# Patient Record
Sex: MALE | Race: WHITE | NOT HISPANIC OR LATINO
[De-identification: names, ages, dates, MRNs, and addresses within clinical notes are randomized per-mention and may not be internally consistent; named-entity substitution may affect disease eponyms.]

---
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
## 2021-05-13 ENCOUNTER — TRANSCRIPTION ENCOUNTER (OUTPATIENT)
Age: 21
End: 2021-05-13

## 2023-05-22 ENCOUNTER — APPOINTMENT (OUTPATIENT)
Dept: ORTHOPEDIC SURGERY | Facility: CLINIC | Age: 23
End: 2023-05-22
Payer: COMMERCIAL

## 2023-05-22 VITALS — BODY MASS INDEX: 22.46 KG/M2 | HEIGHT: 74 IN | WEIGHT: 175 LBS

## 2023-05-22 PROCEDURE — 99204 OFFICE O/P NEW MOD 45 MIN: CPT

## 2023-05-29 NOTE — IMAGING
[de-identified] : Left middle finger with pulp laceration. No erythema nor drainage. Able to flex/extend MCP, PIP and DIP. Sensation grossly intact. <2sec cap refill.

## 2023-05-29 NOTE — ASSESSMENT
[FreeTextEntry1] : Left middle finger laceration - reviewed pathoanatomy. Will complete course of abx. ROM permitted. Discussed risk of stiffness, pain, infection.\par \par F/u 1 week

## 2023-05-29 NOTE — HISTORY OF PRESENT ILLNESS
[de-identified] : 22M, RHD, No PMHX presents with left hand middle finger laceration from 5/12/23. Reprots weas moving a ramp in a backyard when a screw was out, his figner was caught and he obtained a laceration. He Reports going to [facility] and sutures placed. He admits to feeling numbness/tingling in the tip of the finger. Denies pain. presents splinted and wrapped.

## 2023-06-05 ENCOUNTER — APPOINTMENT (OUTPATIENT)
Dept: ORTHOPEDIC SURGERY | Facility: CLINIC | Age: 23
End: 2023-06-05
Payer: COMMERCIAL

## 2023-06-05 DIAGNOSIS — S69.90XA UNSPECIFIED INJURY OF UNSPECIFIED WRIST, HAND AND FINGER(S), INITIAL ENCOUNTER: ICD-10-CM

## 2023-06-05 PROCEDURE — 99213 OFFICE O/P EST LOW 20 MIN: CPT

## 2023-06-05 NOTE — IMAGING
[de-identified] : Left middle finger with pulp laceration - healed, callus flap debrided. No erythema nor drainage. Able to flex/extend MCP, PIP and DIP. Sensation grossly intact. <2sec cap refill.

## 2023-06-05 NOTE — ASSESSMENT
[FreeTextEntry1] : Left middle finger laceration - reviewed pathoanatomy.  ROM permitted. Discussed risk of stiffness, pain, infection. Flap of dead skin debrided.\par \par F/u 2.5 week; consider OT if tight

## 2023-06-05 NOTE — HISTORY OF PRESENT ILLNESS
[de-identified] : 22M, RHD, No PMHX presents with left hand middle finger laceration from 5/12/23. Reprots weas moving a ramp in a backyard when a screw was out, his figner was caught and he obtained a laceration. He Reports going to [facility] and sutures placed. He admits to feeling numbness/tingling in the tip of the finger. Denies pain. presents splinted and wrapped.\par \par 06/05/2023: f/u left middle finger. Reports he has more relief since last visit. Denies any pain medication. States he has numbness at the tip of his left middle finger and on the side.

## 2023-06-26 ENCOUNTER — APPOINTMENT (OUTPATIENT)
Dept: ORTHOPEDIC SURGERY | Facility: CLINIC | Age: 23
End: 2023-06-26

## 2025-03-14 ENCOUNTER — EMERGENCY (EMERGENCY)
Facility: HOSPITAL | Age: 25
LOS: 1 days | Discharge: HOME | End: 2025-03-14
Attending: EMERGENCY MEDICINE
Payer: COMMERCIAL

## 2025-03-14 VITALS
RESPIRATION RATE: 18 BRPM | TEMPERATURE: 98 F | SYSTOLIC BLOOD PRESSURE: 133 MMHG | HEIGHT: 65 IN | DIASTOLIC BLOOD PRESSURE: 67 MMHG | HEART RATE: 71 BPM | OXYGEN SATURATION: 100 % | WEIGHT: 182.32 LBS

## 2025-03-14 PROCEDURE — 99283 EMERGENCY DEPT VISIT LOW MDM: CPT

## 2025-03-14 PROCEDURE — 73562 X-RAY EXAM OF KNEE 3: CPT

## 2025-03-14 PROCEDURE — 73562 X-RAY EXAM OF KNEE 3: CPT | Mod: 26,LT

## 2025-03-14 RX ORDER — IBUPROFEN 200 MG
400 TABLET ORAL ONCE
Refills: 0 | Status: COMPLETED | OUTPATIENT
Start: 2025-03-14 | End: 2025-03-14

## 2025-03-14 RX ADMIN — Medication 400 MILLIGRAM(S): at 12:25

## 2025-03-14 NOTE — ED PROVIDER NOTE - PLAN OF CARE
23 y/o male with atraumatic left knee pain. Xrays reviewed, negative for fracture/dislocation. He was placed in a compressive ace wrap for support and advised that this could be switched to knee sleeve if needed. RICE therapy advised. Will follow up with outpatient ortho clinic.

## 2025-03-14 NOTE — ED PROVIDER NOTE - OBJECTIVE STATEMENT
23 y/o male with no PMHx presents to the ED for evaluation of left knee pain. Patient states he was playing soccer 4 days ago when he noticed lateral sided knee pain. Denies any fall or collision and was able to continue playing through pain. States he has had increased pain throughout the week with clicking but denies locking, buckling. Does describe pain at extremes of motion as well as some mild pain with ambulation. No allergies to medications.

## 2025-03-14 NOTE — ED PROVIDER NOTE - PHYSICAL EXAMINATION
GEN - NAD; well appearing; A&O x3   HEAD - NC/AT   EYES- PERRL, EOMI  ENT: Airway patent, mmm, Oral cavity and pharynx normal. No inflammation, swelling, exudate, or lesions.    BACK - Normal  spine   EXTREMITIES -  degrees L knee, medial joint line tenderness, (-) Carrol, (-) Steinmann, otherwise FROM, symmetric pulses, capillary refill < 2 seconds, no edema, 5/5 strength in b/l UE and LE  SKIN - no rash or bruising   NEUROLOGIC - alert, speech clear, no focal deficits, CN II-XII grossly intact, sensation grossly intact  PSYCH -nl mood/affect, nl insight.

## 2025-03-14 NOTE — ED PROVIDER NOTE - NSFOLLOWUPINSTRUCTIONS_ED_ALL_ED_FT
Continue ace wrap / knee sleeve for support.   Rest, Ice, and elevation encouraged  Ibuprofen as needed for pain  Ortho follow up: Dr. Steven Bates [phone]

## 2025-03-14 NOTE — ED PROVIDER NOTE - NS ED ROS FT
ROS   GENERAL: No fever, no chills, no malaise, no fatigue   ENT: No earache, no coryza, no sore throat   NECK: No stiffness, no swollen glands, no dysphagia   RESPIRATORY: No cough, no dyspnea, no pleuritic chest pain   HEART: no chest pain, no palpitations, no edema, no jvd   ABDOMEN: No abdominal pain, no nausea, no vomiting, no diarrhea   : No dysuria, no increase frequency, no urgency, No discharge   MUSCULAR-SKELETAL: No myalgia, + arthralgia   NEUROLOGY: No headache, no vertigo, no paresthesia, no focal deficits, no diplopia   SKIN: No rash, no evidence of trauma  All other ROS are negative

## 2025-03-14 NOTE — ED PROVIDER NOTE - ADDITIONAL NOTES AND INSTRUCTIONS:
Continue ace wrap / knee sleeve for support.   Rest, Ice, and elevation encouraged  Ibuprofen as needed for pain  Ortho follow up: Dr. Steven Bates [phone]

## 2025-03-14 NOTE — ED PROVIDER NOTE - CARE PLAN
Assessment and plan of treatment:	23 y/o male with atraumatic left knee pain. Xrays reviewed, negative for fracture/dislocation. He was placed in a compressive ace wrap for support and advised that this could be switched to knee sleeve if needed. RICE therapy advised. Will follow up with outpatient ortho clinic.

## 2025-03-14 NOTE — ED PROVIDER NOTE - CLINICAL SUMMARY MEDICAL DECISION MAKING FREE TEXT BOX
24 year old male with left knee pain after playing soccer. Symptoms consistent with meniscal pathology vs ligamentous injury. Will obtain xray of the L knee to rule out fracture or dislocation. If xray negative will recommend follow up with follow up with outpatient orthopedics. Motrin provided for pain control. RICE therapy also recommended for symptomatic treatment.

## 2025-03-14 NOTE — ED ADULT NURSE NOTE - OBJECTIVE STATEMENT
Pt A&OX4. Came in w/ c/o left knee pain after playing soccer on Monday. Pt felt and heard a "click" in his left knee. Denies numbness, weakness, tingling, +ROM, chest pain, palpitations. VS stable, RR even and unlabored, safety maintained.

## 2025-03-14 NOTE — ED PROVIDER NOTE - ATTENDING APP SHARED VISIT CONTRIBUTION OF CARE
23 y/o male with no PMHx presents to the ED for evaluation of left knee pain after playing soccer 4 days ago;  pe left knee from ; no laxity; 5/5 strength TTP over medially;   dx knee pain; xray ; ace and  referral to ortho

## 2025-03-19 ENCOUNTER — APPOINTMENT (OUTPATIENT)
Dept: ORTHOPEDIC SURGERY | Facility: CLINIC | Age: 25
End: 2025-03-19
Payer: COMMERCIAL

## 2025-03-19 VITALS
HEART RATE: 74 BPM | WEIGHT: 180 LBS | BODY MASS INDEX: 23.1 KG/M2 | DIASTOLIC BLOOD PRESSURE: 81 MMHG | HEIGHT: 74 IN | SYSTOLIC BLOOD PRESSURE: 129 MMHG

## 2025-03-19 DIAGNOSIS — M25.562 PAIN IN LEFT KNEE: ICD-10-CM

## 2025-03-19 PROCEDURE — 99203 OFFICE O/P NEW LOW 30 MIN: CPT

## 2025-03-20 ENCOUNTER — OUTPATIENT (OUTPATIENT)
Dept: OUTPATIENT SERVICES | Facility: HOSPITAL | Age: 25
LOS: 1 days | End: 2025-03-20
Payer: COMMERCIAL

## 2025-03-20 ENCOUNTER — APPOINTMENT (OUTPATIENT)
Dept: MRI IMAGING | Facility: CLINIC | Age: 25
End: 2025-03-20
Payer: COMMERCIAL

## 2025-03-20 DIAGNOSIS — Z00.8 ENCOUNTER FOR OTHER GENERAL EXAMINATION: ICD-10-CM

## 2025-03-20 DIAGNOSIS — M25.562 PAIN IN LEFT KNEE: ICD-10-CM

## 2025-03-20 PROBLEM — Z78.9 OTHER SPECIFIED HEALTH STATUS: Chronic | Status: ACTIVE | Noted: 2025-03-14

## 2025-03-20 PROCEDURE — 73721 MRI JNT OF LWR EXTRE W/O DYE: CPT | Mod: 26,LT

## 2025-03-20 PROCEDURE — 73721 MRI JNT OF LWR EXTRE W/O DYE: CPT

## 2025-03-24 ENCOUNTER — APPOINTMENT (OUTPATIENT)
Dept: ORTHOPEDIC SURGERY | Facility: CLINIC | Age: 25
End: 2025-03-24
Payer: COMMERCIAL

## 2025-03-24 DIAGNOSIS — S83.511D SPRAIN OF ANTERIOR CRUCIATE LIGAMENT OF RIGHT KNEE, SUBSEQUENT ENCOUNTER: ICD-10-CM

## 2025-03-24 DIAGNOSIS — S83.512D SPRAIN OF ANTERIOR CRUCIATE LIGAMENT OF LEFT KNEE, SUBSEQUENT ENCOUNTER: ICD-10-CM

## 2025-03-24 PROCEDURE — 99215 OFFICE O/P EST HI 40 MIN: CPT

## 2025-05-12 ENCOUNTER — APPOINTMENT (OUTPATIENT)
Dept: ORTHOPEDIC SURGERY | Facility: CLINIC | Age: 25
End: 2025-05-12
